# Patient Record
Sex: FEMALE | Race: WHITE | Employment: FULL TIME | URBAN - METROPOLITAN AREA
[De-identification: names, ages, dates, MRNs, and addresses within clinical notes are randomized per-mention and may not be internally consistent; named-entity substitution may affect disease eponyms.]

---

## 2019-02-04 ENCOUNTER — OFFICE VISIT (OUTPATIENT)
Dept: PRIMARY CARE CLINIC | Age: 59
End: 2019-02-04

## 2019-02-04 VITALS
RESPIRATION RATE: 16 BRPM | BODY MASS INDEX: 34.53 KG/M2 | SYSTOLIC BLOOD PRESSURE: 179 MMHG | HEART RATE: 94 BPM | HEIGHT: 67 IN | WEIGHT: 220 LBS | DIASTOLIC BLOOD PRESSURE: 104 MMHG | OXYGEN SATURATION: 95 % | TEMPERATURE: 98.1 F

## 2019-02-04 DIAGNOSIS — Z76.89 ENCOUNTER TO ESTABLISH CARE: ICD-10-CM

## 2019-02-04 DIAGNOSIS — I10 ESSENTIAL HYPERTENSION: Primary | ICD-10-CM

## 2019-02-04 RX ORDER — BIOTIN 10000 MCG
10000 TABLET,DISINTEGRATING ORAL DAILY
COMMUNITY

## 2019-02-04 RX ORDER — HYDROCHLOROTHIAZIDE 25 MG/1
25 TABLET ORAL DAILY
Qty: 30 TAB | Refills: 0 | Status: SHIPPED | OUTPATIENT
Start: 2019-02-04 | End: 2019-03-06 | Stop reason: SDUPTHER

## 2019-02-04 RX ORDER — NYSTATIN AND TRIAMCINOLONE ACETONIDE 100000; 1 [USP'U]/G; MG/G
OINTMENT TOPICAL
COMMUNITY
Start: 2019-01-23 | End: 2019-02-07 | Stop reason: ALTCHOICE

## 2019-02-04 RX ORDER — ASPIRIN 81 MG/1
TABLET ORAL DAILY
COMMUNITY

## 2019-02-04 RX ORDER — LISINOPRIL 10 MG/1
10 TABLET ORAL DAILY
Qty: 30 TAB | Refills: 0 | Status: SHIPPED | OUTPATIENT
Start: 2019-02-04 | End: 2019-03-06 | Stop reason: SDUPTHER

## 2019-02-04 NOTE — PROGRESS NOTES
Chief Complaint Patient presents with [address] [place] [address]  Blood Pressure Check  
  not currently taking any meds, but was told by OBGYN to get checked out 1. Have you been to the ER, urgent care clinic since your last visit? Hospitalized since your last visit? No 
 
2. Have you seen or consulted any other health care providers outside of the [address] [place] [address] since your last visit? Include any pap smears or colon screening. No 
 
 
Never had colonscopy, wants flu shot but we don't have anymore here. Needs mammo, last one in 2016.

## 2019-02-04 NOTE — PROGRESS NOTES
Subjective: Chief Complaint Patient presents with [facility]  Blood Pressure Check  
  not currently taking any meds, but was told by OBGYN to get checked out She  is a 66y.o. year old female who presents today as a new patient to establish as well as with some concerns. She reports that when she went to her Ob-gyn office couple of weeks ago BP was found to be in 180s/100s. She has been having some dull headache with no associated N/V, blurry vision otherwise doing fine. Patient reports that she does have hx of depression. Previously she was on medication but could not tolerate so she stopped. Notes  easily gets emotional but she does not require any treatment at this point she states. No other concern today. A comprehensive review of systems was negative except for that written in the HPI. Objective:  
 
Vitals:  
 02/04/19 1016 BP: (!) 180/104 Pulse: 94 Resp: 16 Temp: 98.1 °F (36.7 °C) TempSrc: Oral  
SpO2: 95% Weight: 220 lb (99.8 kg) Height: 5' 7\" (1.702 m) Physical Examination: General appearance - alert, well appearing, and in no distress, oriented to person, place, and time and overweight Mental status - alert, oriented to person, place, and time, normal mood, behavior, speech, dress, motor activity, and thought processes Ears - bilateral TM's and external ear canals normal 
Nose - normal and patent, no erythema, discharge or polyps Mouth - mucous membranes moist, pharynx normal without lesions Neck - supple, no significant adenopathy, thyroid exam: thyroid is normal in size without nodules or tenderness Chest - clear to auscultation, no wheezes, rales or rhonchi, symmetric air entry Heart - normal rate, regular rhythm, normal S1, S2, no murmurs, rubs, clicks or gallops Neurological - alert, oriented, normal speech, no focal findings or movement disorder noted Extremities - no pedal edema noted Allergies Allergen Reactions  Pcn [Penicillins] Unknown (comments) Had reaction as child but does not remember what kind of reaction Social History Socioeconomic History  Marital status:  Spouse name: Not on file  Number of children: Not on file  Years of education: Not on file  Highest education level: Not on file Tobacco Use  Smoking status: Former Smoker  Smokeless tobacco: Never Used Substance and Sexual Activity  Alcohol use: No  
  Frequency: Never  Drug use: No  
  
No family history on file. Past Surgical History:  
Procedure Laterality Date [address]  HX TONSILLECTOMY  HX WISDOM TEETH EXTRACTION Past Medical History:  
Diagnosis Date  Depression  HPV in female  Seizure [facility])   
 as a child Current Outpatient Medications Medication Sig Dispense Refill  nystatin-triamcinolone (MYCOLOG) 100,000-0.1 unit/gram-% ointment  pseudoephedrine ER (SUDAFED 24 HOUR) 240 mg Tb24 tablet Take 240 mg by mouth every twenty-four (24) hours.  biotin 10,000 mcg TbDi Take  by mouth.  aspirin delayed-release 81 mg tablet Take  by mouth daily. Assessment/ Plan:  
Diagnoses and all orders for this visit: 1. Essential hypertension -     METABOLIC PANEL, BASIC 
-   Start   lisinopril (PRINIVIL, ZESTRIL) 10 mg tablet; Take 1 Tab by mouth daily. 
-    Start  hydroCHLOROthiazide (HYDRODIURIL) 25 mg tablet; Take 1 Tab by mouth daily. - counseled on low salt diet, exercise, effort to loose weight. 2. Encounter to establish care -     METABOLIC PANEL, BASIC 
-     lisinopril (PRINIVIL, ZESTRIL) 10 mg tablet; Take 1 Tab by mouth daily. -     hydroCHLOROthiazide (HYDRODIURIL) 25 mg tablet; Take 1 Tab by mouth daily. Medication risks/benefits/costs/interactions/alternatives discussed with patient.  
Advised patient to call back or return to office if symptoms worsen/change/persist. If patient cannot reach us or should anything more severe/urgent arise he/she should proceed directly to the nearest emergency department. Discussed expected course/resolution/complications of diagnosis in detail with patient. Patient given a written after visit summary which includes her diagnoses, current medications and vitals. Patient expressed understanding with the diagnosis and plan. Follow-up Disposition: 
Return in about 3 weeks (around 2/25/2019) for complete physical  and fasting blood work., Bring BP log book. , have fasting blood work done 2-3 day.

## 2019-02-04 NOTE — PATIENT INSTRUCTIONS
DASH Diet: Care Instructions Your Care Instructions The DASH diet is an eating plan that can help lower your blood pressure. DASH stands for Dietary Approaches to Stop Hypertension. Hypertension is high blood pressure. The DASH diet focuses on eating foods that are high in calcium, potassium, and magnesium. These nutrients can lower blood pressure. The foods that are highest in these nutrients are fruits, vegetables, low-fat dairy products, nuts, seeds, and legumes. But taking calcium, potassium, and magnesium supplements instead of eating foods that are high in those nutrients does not have the same effect. The DASH diet also includes whole grains, fish, and poultry. The DASH diet is one of several lifestyle changes your doctor may recommend to lower your high blood pressure. Your doctor may also want you to decrease the amount of sodium in your diet. Lowering sodium while following the DASH diet can lower blood pressure even further than just the DASH diet alone. Follow-up care is a key part of your treatment and safety. Be sure to make and go to all appointments, and call your doctor if you are having problems. It's also a good idea to know your test results and keep a list of the medicines you take. How can you care for yourself at home? Following the DASH diet · Eat 4 to 5 servings of fruit each day. A serving is 1 medium-sized piece of fruit, ½ cup chopped or canned fruit, 1/4 cup dried fruit, or 4 ounces (½ cup) of fruit juice. Choose fruit more often than fruit juice. · Eat 4 to 5 servings of vegetables each day. A serving is 1 cup of lettuce or raw leafy vegetables, ½ cup of chopped or cooked vegetables, or 4 ounces (½ cup) of vegetable juice. Choose vegetables more often than vegetable juice. · Get 2 to 3 servings of low-fat and fat-free dairy each day. A serving is 8 ounces of milk, 1 cup of yogurt, or 1 ½ ounces of cheese. · Eat 6 to 8 servings of grains each day. A serving is 1 slice of bread, 1 ounce of dry cereal, or ½ cup of cooked rice, pasta, or cooked cereal. Try to choose whole-grain products as much as possible. · Limit lean meat, poultry, and fish to 2 servings each day. A serving is 3 ounces, about the size of a deck of cards. · Eat 4 to 5 servings of nuts, seeds, and legumes (cooked dried beans, lentils, and split peas) each week. A serving is 1/3 cup of nuts, 2 tablespoons of seeds, or ½ cup of cooked beans or peas. · Limit fats and oils to 2 to 3 servings each day. A serving is 1 teaspoon of vegetable oil or 2 tablespoons of salad dressing. · Limit sweets and added sugars to 5 servings or less a week. A serving is 1 tablespoon jelly or jam, ½ cup sorbet, or 1 cup of lemonade. · Eat less than 2,300 milligrams (mg) of sodium a day. If you limit your sodium to 1,500 mg a day, you can lower your blood pressure even more. Tips for success · Start small. Do not try to make dramatic changes to your diet all at once. You might feel that you are missing out on your favorite foods and then be more likely to not follow the plan. Make small changes, and stick with them. Once those changes become habit, add a few more changes. · Try some of the following: ? Make it a goal to eat a fruit or vegetable at every meal and at snacks. This will make it easy to get the recommended amount of fruits and vegetables each day. ? Try yogurt topped with fruit and nuts for a snack or healthy dessert. ? Add lettuce, tomato, cucumber, and onion to sandwiches. ? Combine a ready-made pizza crust with low-fat mozzarella cheese and lots of vegetable toppings. Try using tomatoes, squash, spinach, broccoli, carrots, cauliflower, and onions. ? Have a variety of cut-up vegetables with a low-fat dip as an appetizer instead of chips and dip. ? Sprinkle sunflower seeds or chopped almonds over salads.  Or try adding chopped walnuts or almonds to cooked vegetables. ? Try some vegetarian meals using beans and peas. Add garbanzo or kidney beans to salads. Make burritos and tacos with mashed edge beans or black beans. Where can you learn more? Go to http://randee-juana.info/. Enter S604 in the search box to learn more about \"DASH Diet: Care Instructions. \" Current as of: July 22, 2018 Content Version: 11.9 © 5099-1853 [employer]. Care instructions adapted under license by [employer] (which disclaims liability or warranty for this information). If you have questions about a medical condition or this instruction, always ask your healthcare professional. [address] any warranty or liability for your use of this information. Learning About High Blood Pressure What is high blood pressure? Blood pressure is a measure of how hard the blood pushes against the walls of your arteries. It's normal for blood pressure to go up and down throughout the day, but if it stays up, you have high blood pressure. Another name for high blood pressure is hypertension. Two numbers tell you your blood pressure. The first number is the systolic pressure. It shows how hard the blood pushes when your heart is pumping. The second number is the diastolic pressure. It shows how hard the blood pushes between heartbeats, when your heart is relaxed and filling with blood. Your doctor will give you a goal for your blood pressure. Your goal will be based on your health and your age. High blood pressure (hypertension) means that the top number stays high, or the bottom number stays high, or both. High blood pressure increases the risk of stroke, heart attack, and other problems. You and your doctor will talk about your risks of these problems based on your blood pressure. What happens when you have high blood pressure? · Blood flows through your arteries with too much force. Over time, this damages the walls of your arteries. But you can't feel it. High blood pressure usually doesn't cause symptoms. · Fat and calcium start to build up in your arteries. This buildup is called plaque. Plaque makes your arteries narrower and stiffer. Blood can't flow through them as easily. · This lack of good blood flow starts to damage some of the organs in your body. This can lead to problems such as coronary artery disease and heart attack, heart failure, stroke, kidney failure, and eye damage. How can you prevent high blood pressure? · Stay at a healthy weight. · Try to limit how much sodium you eat to less than 2,300 milligrams (mg) a day. If you limit your sodium to 1,500 mg a day, you can lower your blood pressure even more. ? Buy foods that are labeled \"unsalted,\" \"sodium-free,\" or \"low-sodium. \" Foods labeled \"reduced-sodium\" and \"light sodium\" may still have too much sodium. ? Flavor your food with garlic, lemon juice, onion, vinegar, herbs, and spices instead of salt. Do not use soy sauce, steak sauce, onion salt, garlic salt, mustard, or ketchup on your food. ? Use less salt (or none) when recipes call for it. You can often use half the salt a recipe calls for without losing flavor. · Be physically active. Get at least 30 minutes of exercise on most days of the week. Walking is a good choice. You also may want to do other activities, such as running, swimming, cycling, or playing tennis or team sports. · Limit alcohol to 2 drinks a day for men and 1 drink a day for women. · Eat plenty of fruits, vegetables, and low-fat dairy products. Eat less saturated and total fats. How is high blood pressure treated? · Your doctor will suggest making lifestyle changes. For example, your doctor may ask you to eat healthy foods, quit smoking, lose extra weight, and be more active. · If lifestyle changes don't help enough, your doctor may recommend that you take medicine. · When blood pressure is very high, medicines are needed to lower it. Follow-up care is a key part of your treatment and safety. Be sure to make and go to all appointments, and call your doctor if you are having problems. It's also a good idea to know your test results and keep a list of the medicines you take. Where can you learn more? Go to http://randee-juana.info/. Enter P501 in the search box to learn more about \"Learning About High Blood Pressure. \" Current as of: July 22, 2018 Content Version: 11.9 © 5709-1504 [employer]. Care instructions adapted under license by [employer] (which disclaims liability or warranty for this information). If you have questions about a medical condition or this instruction, always ask your healthcare professional. [address] any warranty or liability for your use of this information.

## 2019-02-05 ENCOUNTER — TELEPHONE (OUTPATIENT)
Dept: PRIMARY CARE CLINIC | Age: 59
End: 2019-02-05

## 2019-02-05 LAB
BUN SERPL-MCNC: 10 MG/DL (ref 6–24)
BUN/CREAT SERPL: 14 (ref 9–23)
CALCIUM SERPL-MCNC: 9.6 MG/DL (ref 8.7–10.2)
CHLORIDE SERPL-SCNC: 100 MMOL/L (ref 96–106)
CO2 SERPL-SCNC: 23 MMOL/L (ref 20–29)
CREAT SERPL-MCNC: 0.72 MG/DL (ref 0.57–1)
GLUCOSE SERPL-MCNC: 286 MG/DL (ref 65–99)
POTASSIUM SERPL-SCNC: 4.2 MMOL/L (ref 3.5–5.2)
SODIUM SERPL-SCNC: 137 MMOL/L (ref 134–144)

## 2019-02-05 NOTE — TELEPHONE ENCOUNTER
----- Message from David Sparrow MD sent at 2/5/2019  7:57 AM EST -----  Please call patient:    Blood sugar level is very high. I am concern about diabetes. She needs to come in for follow up as an office visit as soon as possible. Okay to double book if needed.

## 2019-02-05 NOTE — TELEPHONE ENCOUNTER
Patient stated she is at work- you can leave a detailed message. Or she will try to  the phone later.

## 2019-02-05 NOTE — PROGRESS NOTES
Please call patient: 
 
Blood sugar level is very high. I am concern about diabetes. She needs to come in for follow up as an office visit as soon as possible. Okay to double book if needed.

## 2019-02-07 ENCOUNTER — OFFICE VISIT (OUTPATIENT)
Dept: PRIMARY CARE CLINIC | Age: 59
End: 2019-02-07

## 2019-02-07 VITALS
WEIGHT: 218.8 LBS | RESPIRATION RATE: 17 BRPM | HEIGHT: 67 IN | HEART RATE: 91 BPM | BODY MASS INDEX: 34.34 KG/M2 | OXYGEN SATURATION: 96 % | TEMPERATURE: 98.5 F | SYSTOLIC BLOOD PRESSURE: 123 MMHG | DIASTOLIC BLOOD PRESSURE: 84 MMHG

## 2019-02-07 DIAGNOSIS — I10 ESSENTIAL HYPERTENSION: ICD-10-CM

## 2019-02-07 DIAGNOSIS — E11.9 TYPE 2 DIABETES MELLITUS WITHOUT COMPLICATION, WITH LONG-TERM CURRENT USE OF INSULIN (HCC): Primary | ICD-10-CM

## 2019-02-07 DIAGNOSIS — Z79.4 TYPE 2 DIABETES MELLITUS WITHOUT COMPLICATION, WITH LONG-TERM CURRENT USE OF INSULIN (HCC): Primary | ICD-10-CM

## 2019-02-07 DIAGNOSIS — R73.9 ELEVATED BLOOD SUGAR LEVEL: ICD-10-CM

## 2019-02-07 LAB — HBA1C MFR BLD HPLC: 11.7 %

## 2019-02-07 RX ORDER — LANCETS
EACH MISCELLANEOUS
Qty: 100 EACH | Refills: 11 | Status: SHIPPED | OUTPATIENT
Start: 2019-02-07 | End: 2019-02-18 | Stop reason: ALTCHOICE

## 2019-02-07 RX ORDER — BLOOD-GLUCOSE METER
EACH MISCELLANEOUS
Qty: 1 EACH | Refills: 0 | Status: SHIPPED | OUTPATIENT
Start: 2019-02-07 | End: 2019-02-18 | Stop reason: ALTCHOICE

## 2019-02-07 RX ORDER — METFORMIN HYDROCHLORIDE 500 MG/1
500 TABLET, EXTENDED RELEASE ORAL 2 TIMES DAILY WITH MEALS
Qty: 60 TAB | Refills: 2 | Status: SHIPPED | OUTPATIENT
Start: 2019-02-07 | End: 2019-03-14

## 2019-02-07 NOTE — PROGRESS NOTES
Subjective: Chief Complaint Patient presents with  
 High Blood Sugar  Medication Evaluation BP medication was making her feel dizzy, nasueous \"medicine head\" She  is a 66y.o. year old female with recently diagnosed HTN who presents today to discuss lab results. Her recent lab results showed random blood sugar 286. Never been diagnosed with DM in the past.  
Patient reports that since she started taking BP med she felt little lightheaded but headache is gone. Denies any urinary symptoms, increase thirst, chest pain. Lab Results Component Value Date/Time Sodium 137 02/04/2019 11:03 AM  
 Potassium 4.2 02/04/2019 11:03 AM  
 Chloride 100 02/04/2019 11:03 AM  
 CO2 23 02/04/2019 11:03 AM  
 Glucose 286 (H) 02/04/2019 11:03 AM  
 BUN 10 02/04/2019 11:03 AM  
 Creatinine 0.72 02/04/2019 11:03 AM  
 BUN/Creatinine ratio 14 02/04/2019 11:03 AM  
 GFR est  02/04/2019 11:03 AM  
 GFR est non-AA 94 02/04/2019 11:03 AM  
 Calcium 9.6 02/04/2019 11:03 AM  
 
 
 
Pertinent items are noted in HPI. Objective:  
 
Vitals:  
 02/07/19 1450 BP: 123/84 Pulse: 91  
Resp: 17 Temp: 98.5 °F (36.9 °C) TempSrc: Oral  
SpO2: 96% Weight: 218 lb 12.8 oz (99.2 kg) Height: 5' 7\" (1.702 m) Physical Examination: General appearance - alert, well appearing, and in no distress, oriented to person, place, and time and overweight Mental status - alert, oriented to person, place, and time, normal mood, behavior, speech, dress, motor activity, and thought processes Chest - clear to auscultation, no wheezes, rales or rhonchi, symmetric air entry Heart - normal rate, regular rhythm, normal S1, S2, no murmurs, rubs, clicks or gallops. Allergies Allergen Reactions  Pcn [Penicillins] Unknown (comments) Had reaction as child but does not remember what kind of reaction Social History Socioeconomic History  Marital status:  Spouse name: Not on file  Number of children: Not on file  Years of education: Not on file  Highest education level: Not on file Tobacco Use  Smoking status: Former Smoker  Smokeless tobacco: Never Used Substance and Sexual Activity  Alcohol use: No  
  Frequency: Never  Drug use: No  
 Sexual activity: Yes  
  Partners: Male Family History Problem Relation Age of Onset  Hypertension Mother  Diabetes Father  Hypertension Sister Past Surgical History:  
Procedure Laterality Date [address]  HX TONSILLECTOMY  HX WISDOM TEETH EXTRACTION Past Medical History:  
Diagnosis Date  Depression  HPV in female  Seizure [facility])   
 as a child Current Outpatient Medications Medication Sig Dispense Refill  phenylephrine HCl (SUDAFED PE PO) Take 1 Tab by mouth every morning.  biotin 10,000 mcg TbDi Take 10,000 mcg by mouth daily.  aspirin delayed-release 81 mg tablet Take  by mouth daily.  lisinopril (PRINIVIL, ZESTRIL) 10 mg tablet Take 1 Tab by mouth daily. 30 Tab 0  
 hydroCHLOROthiazide (HYDRODIURIL) 25 mg tablet Take 1 Tab by mouth daily. 30 Tab 0 Assessment/ Plan:  
Diagnoses and all orders for this visit: 
 
1. Type 2 diabetes mellitus without complication, with long-term current use of insulin ([facility] [address] -    A1c is 11.7 Newly diagnosed diabetic. 
 -   Start metFORMIN ER (GLUCOPHAGE XR) 500 mg tablet; Take 1 Tab by mouth two (2) times daily (with meals). -   Start   SITagliptin (JANUVIA) 50 mg tablet; Take 1 Tab by mouth daily. 
-     MICROALBUMIN, UR, RAND W/ MICROALB/CREAT RATIO 
-     REFERRAL TO OPHTHALMOLOGY 
-     Blood-Glucose Meter (ONETOUCH VERIO FLEX) misc; Check fasting blood sugar Dx:Type 2 diabetes mellitus without complication, with long-term current use of insulin E11.9 
-     glucose blood VI test strips (ONETOUCH VERIO) strip;  Checking fasting blood sugar Dx: Type 2 diabetes mellitus without complication, with long-term current use of insulin E11.9 
-     lancets misc; Checking fasting blood sugar Dx: Type 2 diabetes mellitus without complication, with long-term current use of insulin E11.9 
      -      Yearly eye exam, foot care discussed. -      Offered diabetic nutritionist counseling but Pt would like to defer at this tie. 2. Elevated blood sugar level -     AMB POC HEMOGLOBIN A1C 11.7. See above. 3. Essential hypertension - BP well controlled with current med. Medication risks/benefits/costs/interactions/alternatives discussed with patient. Advised patient to call back or return to office if symptoms worsen/change/persist. If patient cannot reach us or should anything more severe/urgent arise he/she should proceed directly to the nearest emergency department. Discussed expected course/resolution/complications of diagnosis in detail with patient. Patient given a written after visit summary which includes her diagnoses, current medications and vitals. Patient expressed understanding with the diagnosis and plan. Follow-up Disposition: 
Return for 3-4 weeks for BP follow up. , complete physical  and fasting blood work. Jakob Panda

## 2019-02-07 NOTE — PATIENT INSTRUCTIONS
Learning About Diabetes Food Guidelines Your Care Instructions Meal planning is important to manage diabetes. It helps keep your blood sugar at a target level (which you set with your doctor). You don't have to eat special foods. You can eat what your family eats, including sweets once in a while. But you do have to pay attention to how often you eat and how much you eat of certain foods. You may want to work with a dietitian or a certified diabetes educator (CDE) to help you plan meals and snacks. A dietitian or CDE can also help you lose weight if that is one of your goals. What should you know about eating carbs? Managing the amount of carbohydrate (carbs) you eat is an important part of healthy meals when you have diabetes. Carbohydrate is found in many foods. · Learn which foods have carbs. And learn the amounts of carbs in different foods. ? Bread, cereal, pasta, and rice have about 15 grams of carbs in a serving. A serving is 1 slice of bread (1 ounce), ½ cup of cooked cereal, or 1/3 cup of cooked pasta or rice. ? Fruits have 15 grams of carbs in a serving. A serving is 1 small fresh fruit, such as an apple or orange; ½ of a banana; ½ cup of cooked or canned fruit; ½ cup of fruit juice; 1 cup of melon or raspberries; or 2 tablespoons of dried fruit. ? Milk and no-sugar-added yogurt have 15 grams of carbs in a serving. A serving is 1 cup of milk or 2/3 cup of no-sugar-added yogurt. ? Starchy vegetables have 15 grams of carbs in a serving. A serving is ½ cup of mashed potatoes or sweet potato; 1 cup winter squash; ½ of a small baked potato; ½ cup of cooked beans; or ½ cup cooked corn or green peas. · Learn how much carbs to eat each day and at each meal. A dietitian or CDE can teach you how to keep track of the amount of carbs you eat. This is called carbohydrate counting.  
· If you are not sure how to count carbohydrate grams, use the Plate Method to plan meals. It is a good, quick way to make sure that you have a balanced meal. It also helps you spread carbs throughout the day. ? Divide your plate by types of foods. Put non-starchy vegetables on half the plate, meat or other protein food on one-quarter of the plate, and a grain or starchy vegetable in the final quarter of the plate. To this you can add a small piece of fruit and 1 cup of milk or yogurt, depending on how many carbs you are supposed to eat at a meal. 
· Try to eat about the same amount of carbs at each meal. Do not \"save up\" your daily allowance of carbs to eat at one meal. 
· Proteins have very little or no carbs per serving. Examples of proteins are beef, chicken, turkey, fish, eggs, tofu, cheese, cottage cheese, and peanut butter. A serving size of meat is 3 ounces, which is about the size of a deck of cards. Examples of meat substitute serving sizes (equal to 1 ounce of meat) are 1/4 cup of cottage cheese, 1 egg, 1 tablespoon of peanut butter, and ½ cup of tofu. How can you eat out and still eat healthy? · Learn to estimate the serving sizes of foods that have carbohydrate. If you measure food at home, it will be easier to estimate the amount in a serving of restaurant food. · If the meal you order has too much carbohydrate (such as potatoes, corn, or baked beans), ask to have a low-carbohydrate food instead. Ask for a salad or green vegetables. · If you use insulin, check your blood sugar before and after eating out to help you plan how much to eat in the future. · If you eat more carbohydrate at a meal than you had planned, take a walk or do other exercise. This will help lower your blood sugar. What else should you know? · Limit saturated fat, such as the fat from meat and dairy products. This is a healthy choice because people who have diabetes are at higher risk of heart disease.  So choose lean cuts of meat and nonfat or low-fat dairy products. Use olive or canola oil instead of butter or shortening when cooking. · Don't skip meals. Your blood sugar may drop too low if you skip meals and take insulin or certain medicines for diabetes. · Check with your doctor before you drink alcohol. Alcohol can cause your blood sugar to drop too low. Alcohol can also cause a bad reaction if you take certain diabetes medicines. Follow-up care is a key part of your treatment and safety. Be sure to make and go to all appointments, and call your doctor if you are having problems. It's also a good idea to know your test results and keep a list of the medicines you take. Where can you learn more? Go to http://randee-juana.info/. Enter C777 in the search box to learn more about \"Learning About Diabetes Food Guidelines. \" Current as of: July 25, 2018 Content Version: 11.9 © 4052-8613 "[employer]. Care instructions adapted under license by [employer] (which disclaims liability or warranty for this information). If you have questions about a medical condition or this instruction, always ask your healthcare professional. [address] any warranty or liability for your use of this information. Diabetes Foot Health: Care Instructions Your Care Instructions When you have diabetes, your feet need extra care and attention. Diabetes can damage the nerve endings and blood vessels in your feet, making you less likely to notice when your feet are injured. Diabetes also limits your body's ability to fight infection and get blood to areas that need it. If you get a minor foot injury, it could become an ulcer or a serious infection. With good foot care, you can prevent most of these problems. Caring for your feet can be quick and easy. Most of the care can be done when you are bathing or getting ready for bed. Follow-up care is a key part of your treatment and safety.  Be sure to make and go to all appointments, and call your doctor if you are having problems. It's also a good idea to know your test results and keep a list of the medicines you take. How can you care for yourself at home? · Keep your blood sugar close to normal by watching what and how much you eat, monitoring blood sugar, taking medicines if prescribed, and getting regular exercise. · Do not smoke. Smoking affects blood flow and can make foot problems worse. If you need help quitting, talk to your doctor about stop-smoking programs and medicines. These can increase your chances of quitting for good. · Eat a diet that is low in fats. High fat intake can cause fat to build up in your blood vessels and decrease blood flow. · Inspect your feet daily for blisters, cuts, cracks, or sores. If you cannot see well, use a mirror or have someone help you. · Take care of your feet: 
? Wash your feet every day. Use warm (not hot) water. Check the water temperature with your wrists or other part of your body, not your feet. ? Dry your feet well. Pat them dry. Do not rub the skin on your feet too hard. Dry well between your toes. If the skin on your feet stays moist, bacteria or a fungus can grow, which can lead to infection. ? Keep your skin soft. Use moisturizing skin cream to keep the skin on your feet soft and prevent calluses and cracks. But do not put the cream between your toes, and stop using any cream that causes a rash. ? Clean underneath your toenails carefully. Do not use a sharp object to clean underneath your toenails. Use the blunt end of a nail file or other rounded tool. ? Trim and file your toenails straight across to prevent ingrown toenails. Use a nail clipper, not scissors. Use an emery board to smooth the edges. · Change socks daily. Socks without seams are best, because seams often rub the feet. You can find socks for people with diabetes from specialty catalogs. · Look inside your shoes every day for things like gravel or torn linings, which could cause blisters or sores. · Buy shoes that fit well: 
? Look for shoes that have plenty of space around the toes. This helps prevent bunions and blisters. ? Try on shoes while wearing the kind of socks you will usually wear with the shoes. ? Avoid plastic shoes. They may rub your feet and cause blisters. Good shoes should be made of materials that are flexible and breathable, such as leather or cloth. ? Break in new shoes slowly by wearing them for no more than an hour a day for several days. Take extra time to check your feet for red areas, blisters, or other problems after you wear new shoes. · Do not go barefoot. Do not wear sandals, and do not wear shoes with very thin soles. Thin soles are easy to puncture. They also do not protect your feet from hot pavement or cold weather. · Have your doctor check your feet during each visit. If you have a foot problem, see your doctor. Do not try to treat an early foot problem at home. Home remedies or treatments that you can buy without a prescription (such as corn removers) can be harmful. · Always get early treatment for foot problems. A minor irritation can lead to a major problem if not properly cared for early. When should you call for help? Call your doctor now or seek immediate medical care if: 
  · You have a foot sore, an ulcer or break in the skin that is not healing after 4 days, bleeding corns or calluses, or an ingrown toenail.  
  · You have blue or black areas, which can mean bruising or blood flow problems.  
  · You have peeling skin or tiny blisters between your toes or cracking or oozing of the skin.  
  · You have a fever for more than 24 hours and a foot sore.  
  · You have new numbness or tingling in your feet that does not go away after you move your feet or change positions.  
  · You have unexplained or unusual swelling of the foot or ankle.  Watch closely for changes in your health, and be sure to contact your doctor if: 
  · You cannot do proper foot care. Where can you learn more? Go to http://randee-juana.info/. Enter A739 in the search box to learn more about \"Diabetes Foot Health: Care Instructions. \" Current as of: July 25, 2018 Content Version: 11.9 © 4585-3721 [employer]. Care instructions adapted under license by [employer] (which disclaims liability or warranty for this information). If you have questions about a medical condition or this instruction, always ask your healthcare professional. [place] [address] any warranty or liability for your use of this information.

## 2019-02-08 LAB
ALBUMIN/CREAT UR: 27.8 MG/G CREAT (ref 0–30)
CREAT UR-MCNC: 68.4 MG/DL
MICROALBUMIN UR-MCNC: 19 UG/ML

## 2019-02-08 NOTE — PROGRESS NOTES
Please notify patient: 
 
Urine test came back normal.  Microalbumin/creatinine ratio is a marker of the amount of protein in your urine. Goal is less than 30. Your value is normal. This indicates that your kidneys are not being affected by your diabetes and/or blood pressure at this time. Continue all medications as prescribed to avoid kidney damage.

## 2019-02-18 NOTE — TELEPHONE ENCOUNTER
Please write a rx for blood sugar monitor, test strips and lancets    One touch ultra mini, [facility] in [place]

## 2019-02-19 RX ORDER — LANCETS
EACH MISCELLANEOUS
Qty: 1 EACH | Refills: 11 | Status: SHIPPED | OUTPATIENT
Start: 2019-02-19

## 2019-02-19 RX ORDER — INSULIN PUMP SYRINGE, 3 ML
EACH MISCELLANEOUS
Qty: 1 KIT | Refills: 0 | Status: SHIPPED | OUTPATIENT
Start: 2019-02-19

## 2019-03-06 DIAGNOSIS — E11.9 TYPE 2 DIABETES MELLITUS WITHOUT COMPLICATION, WITH LONG-TERM CURRENT USE OF INSULIN (HCC): ICD-10-CM

## 2019-03-06 DIAGNOSIS — Z76.89 ENCOUNTER TO ESTABLISH CARE: ICD-10-CM

## 2019-03-06 DIAGNOSIS — I10 ESSENTIAL HYPERTENSION: ICD-10-CM

## 2019-03-06 DIAGNOSIS — Z79.4 TYPE 2 DIABETES MELLITUS WITHOUT COMPLICATION, WITH LONG-TERM CURRENT USE OF INSULIN (HCC): ICD-10-CM

## 2019-03-06 RX ORDER — LISINOPRIL 10 MG/1
10 TABLET ORAL DAILY
Qty: 30 TAB | Refills: 0 | Status: SHIPPED | OUTPATIENT
Start: 2019-03-06 | End: 2019-04-07 | Stop reason: SDUPTHER

## 2019-03-06 RX ORDER — METFORMIN HYDROCHLORIDE 500 MG/1
500 TABLET, EXTENDED RELEASE ORAL 2 TIMES DAILY WITH MEALS
Qty: 60 TAB | Refills: 2 | Status: CANCELLED | OUTPATIENT
Start: 2019-03-06

## 2019-03-06 RX ORDER — HYDROCHLOROTHIAZIDE 25 MG/1
25 TABLET ORAL DAILY
Qty: 30 TAB | Refills: 0 | Status: SHIPPED | OUTPATIENT
Start: 2019-03-06 | End: 2019-04-07 | Stop reason: SDUPTHER

## 2019-03-11 DIAGNOSIS — E11.9 TYPE 2 DIABETES MELLITUS WITHOUT COMPLICATION, WITH LONG-TERM CURRENT USE OF INSULIN (HCC): ICD-10-CM

## 2019-03-11 DIAGNOSIS — Z00.00 WELL ADULT ON ROUTINE HEALTH CHECK: Primary | ICD-10-CM

## 2019-03-11 DIAGNOSIS — I10 ESSENTIAL HYPERTENSION: ICD-10-CM

## 2019-03-11 DIAGNOSIS — Z79.4 TYPE 2 DIABETES MELLITUS WITHOUT COMPLICATION, WITH LONG-TERM CURRENT USE OF INSULIN (HCC): ICD-10-CM

## 2019-03-12 LAB
25(OH)D3+25(OH)D2 SERPL-MCNC: 11.7 NG/ML (ref 30–100)
ALBUMIN SERPL-MCNC: 4.5 G/DL (ref 3.5–5.5)
ALBUMIN/GLOB SERPL: 2 {RATIO} (ref 1.2–2.2)
ALP SERPL-CCNC: 85 IU/L (ref 39–117)
ALT SERPL-CCNC: 19 IU/L (ref 0–32)
AST SERPL-CCNC: 17 IU/L (ref 0–40)
BASOPHILS # BLD AUTO: 0 X10E3/UL (ref 0–0.2)
BASOPHILS NFR BLD AUTO: 0 %
BILIRUB SERPL-MCNC: 0.3 MG/DL (ref 0–1.2)
BUN SERPL-MCNC: 14 MG/DL (ref 6–24)
BUN/CREAT SERPL: 16 (ref 9–23)
CALCIUM SERPL-MCNC: 9.4 MG/DL (ref 8.7–10.2)
CHLORIDE SERPL-SCNC: 95 MMOL/L (ref 96–106)
CHOLEST SERPL-MCNC: 269 MG/DL (ref 100–199)
CO2 SERPL-SCNC: 22 MMOL/L (ref 20–29)
CREAT SERPL-MCNC: 0.89 MG/DL (ref 0.57–1)
EOSINOPHIL # BLD AUTO: 0.1 X10E3/UL (ref 0–0.4)
EOSINOPHIL NFR BLD AUTO: 1 %
ERYTHROCYTE [DISTWIDTH] IN BLOOD BY AUTOMATED COUNT: 13.2 % (ref 12.3–15.4)
GLOBULIN SER CALC-MCNC: 2.3 G/DL (ref 1.5–4.5)
GLUCOSE SERPL-MCNC: 210 MG/DL (ref 65–99)
HCT VFR BLD AUTO: 37 % (ref 34–46.6)
HDLC SERPL-MCNC: 46 MG/DL
HGB BLD-MCNC: 12 G/DL (ref 11.1–15.9)
IMM GRANULOCYTES # BLD AUTO: 0 X10E3/UL (ref 0–0.1)
IMM GRANULOCYTES NFR BLD AUTO: 0 %
LDLC SERPL CALC-MCNC: 187 MG/DL (ref 0–99)
LYMPHOCYTES # BLD AUTO: 2 X10E3/UL (ref 0.7–3.1)
LYMPHOCYTES NFR BLD AUTO: 33 %
MCH RBC QN AUTO: 27 PG (ref 26.6–33)
MCHC RBC AUTO-ENTMCNC: 32.4 G/DL (ref 31.5–35.7)
MCV RBC AUTO: 83 FL (ref 79–97)
MONOCYTES # BLD AUTO: 0.4 X10E3/UL (ref 0.1–0.9)
MONOCYTES NFR BLD AUTO: 7 %
NEUTROPHILS # BLD AUTO: 3.7 X10E3/UL (ref 1.4–7)
NEUTROPHILS NFR BLD AUTO: 59 %
PLATELET # BLD AUTO: 338 X10E3/UL (ref 150–379)
POTASSIUM SERPL-SCNC: 3.8 MMOL/L (ref 3.5–5.2)
PROT SERPL-MCNC: 6.8 G/DL (ref 6–8.5)
RBC # BLD AUTO: 4.44 X10E6/UL (ref 3.77–5.28)
SODIUM SERPL-SCNC: 138 MMOL/L (ref 134–144)
TRIGL SERPL-MCNC: 179 MG/DL (ref 0–149)
TSH SERPL DL<=0.005 MIU/L-ACNC: 3.3 UIU/ML (ref 0.45–4.5)
VLDLC SERPL CALC-MCNC: 36 MG/DL (ref 5–40)
WBC # BLD AUTO: 6.3 X10E3/UL (ref 3.4–10.8)

## 2019-03-14 ENCOUNTER — OFFICE VISIT (OUTPATIENT)
Dept: PRIMARY CARE CLINIC | Age: 59
End: 2019-03-14

## 2019-03-14 VITALS
OXYGEN SATURATION: 96 % | SYSTOLIC BLOOD PRESSURE: 114 MMHG | BODY MASS INDEX: 33.34 KG/M2 | WEIGHT: 212.4 LBS | HEIGHT: 67 IN | HEART RATE: 99 BPM | RESPIRATION RATE: 17 BRPM | DIASTOLIC BLOOD PRESSURE: 63 MMHG | TEMPERATURE: 98.9 F

## 2019-03-14 DIAGNOSIS — E78.5 HYPERLIPIDEMIA LDL GOAL <70: ICD-10-CM

## 2019-03-14 DIAGNOSIS — L60.2 THICKENED NAIL: ICD-10-CM

## 2019-03-14 DIAGNOSIS — Z23 ENCOUNTER FOR IMMUNIZATION: ICD-10-CM

## 2019-03-14 DIAGNOSIS — Z12.39 SCREENING FOR BREAST CANCER: ICD-10-CM

## 2019-03-14 DIAGNOSIS — E55.9 VITAMIN D DEFICIENCY: ICD-10-CM

## 2019-03-14 DIAGNOSIS — Z00.00 WELL ADULT ON ROUTINE HEALTH CHECK: Primary | ICD-10-CM

## 2019-03-14 DIAGNOSIS — Z12.11 SCREENING FOR COLON CANCER: ICD-10-CM

## 2019-03-14 DIAGNOSIS — E11.9 TYPE 2 DIABETES MELLITUS WITHOUT COMPLICATION, WITHOUT LONG-TERM CURRENT USE OF INSULIN (HCC): ICD-10-CM

## 2019-03-14 DIAGNOSIS — I10 ESSENTIAL HYPERTENSION: ICD-10-CM

## 2019-03-14 RX ORDER — METFORMIN HYDROCHLORIDE 500 MG/1
1000 TABLET, EXTENDED RELEASE ORAL 2 TIMES DAILY WITH MEALS
Qty: 120 TAB | Refills: 2 | Status: SHIPPED | OUTPATIENT
Start: 2019-03-14 | End: 2019-08-03 | Stop reason: SDUPTHER

## 2019-03-14 RX ORDER — ATORVASTATIN CALCIUM 20 MG/1
20 TABLET, FILM COATED ORAL DAILY
Qty: 90 TAB | Refills: 1 | Status: SHIPPED | OUTPATIENT
Start: 2019-03-14 | End: 2019-06-14

## 2019-03-14 RX ORDER — ASPIRIN 325 MG
50000 TABLET, DELAYED RELEASE (ENTERIC COATED) ORAL
Qty: 12 CAP | Refills: 0 | Status: SHIPPED | OUTPATIENT
Start: 2019-03-14 | End: 2019-06-14 | Stop reason: ALTCHOICE

## 2019-03-14 NOTE — PROGRESS NOTES
Subjective:   66 y.o. female for Well Woman Check as well as to discuss lab results. DM-2; She is a newly diagnosed diabetic. She was started on Metformin 500 mg BID and Januvia 50 mg daily about a month ago. Reports that FBS has been running in lower 200s range. HTN: BP has been well controlled with Lisinopril  10 mg and HCTZ 25 mg. Patient reports that she did have some stomach upset on 2/15/2019 . She went to [facility] ED where she was told that she had Gastroenteritis. She reports that she is no longer having the abdominal upset but she does have muscle cramp time to time. No LMP recorded. Patient is not currently having periods (Reason: Menopause). Social History: not sexually active. Pertinent past medical hstory: no history of  DVT, CAD,, liver disease, migraines or smoking. Lab Results   Component Value Date/Time    VITAMIN D, 25-HYDROXY 11.7 (L) 03/11/2019 08:12 AM           Lab Results   Component Value Date/Time    WBC 6.3 03/11/2019 08:12 AM    HGB 12.0 03/11/2019 08:12 AM    HCT 37.0 03/11/2019 08:12 AM    PLATELET 803 30/60/0826 08:12 AM    MCV 83 03/11/2019 08:12 AM     Lab Results   Component Value Date/Time    Cholesterol, total 269 (H) 03/11/2019 08:12 AM    HDL Cholesterol 46 03/11/2019 08:12 AM    LDL, calculated 187 (H) 03/11/2019 08:12 AM    Triglyceride 179 (H) 03/11/2019 08:12 AM     Lab Results   Component Value Date/Time    ALT (SGPT) 19 03/11/2019 08:12 AM    AST (SGOT) 17 03/11/2019 08:12 AM    Alk.  phosphatase 85 03/11/2019 08:12 AM    Bilirubin, total 0.3 03/11/2019 08:12 AM    Albumin 4.5 03/11/2019 08:12 AM    Protein, total 6.8 03/11/2019 08:12 AM    PLATELET 447 36/68/4280 08:12 AM     Lab Results   Component Value Date/Time    GFR est non-AA 73 03/11/2019 08:12 AM    GFR est AA 84 03/11/2019 08:12 AM    Creatinine 0.89 03/11/2019 08:12 AM    BUN 14 03/11/2019 08:12 AM    Sodium 138 03/11/2019 08:12 AM    Potassium 3.8 03/11/2019 08:12 AM    Chloride 95 (L) 03/11/2019 08:12 AM    CO2 22 03/11/2019 08:12 AM     Lab Results   Component Value Date/Time    TSH 3.300 03/11/2019 08:12 AM      Lab Results   Component Value Date/Time    Hemoglobin A1c (POC) 11.7 02/07/2019 03:04 PM          Patient Active Problem List   Diagnosis Code    Essential hypertension I10    Type 2 diabetes mellitus without complication, without long-term current use of insulin (HCC) E11.9     Current Outpatient Medications   Medication Sig Dispense Refill    metFORMIN ER (GLUCOPHAGE XR) 500 mg tablet Take 2 Tabs by mouth two (2) times daily (with meals). 120 Tab 2    atorvastatin (LIPITOR) 20 mg tablet Take 1 Tab by mouth daily. 90 Tab 1    cholecalciferol (VITAMIN D3) 50,000 unit capsule Take 1 Cap by mouth every seven (7) days. 12 Cap 0    lisinopril (PRINIVIL, ZESTRIL) 10 mg tablet Take 1 Tab by mouth daily. 30 Tab 0    hydroCHLOROthiazide (HYDRODIURIL) 25 mg tablet Take 1 Tab by mouth daily. 30 Tab 0    SITagliptin (JANUVIA) 50 mg tablet Take 1 Tab by mouth daily. 30 Tab 3    biotin 10,000 mcg TbDi Take 10,000 mcg by mouth daily.  aspirin delayed-release 81 mg tablet Take  by mouth daily.  Blood-Glucose Meter (ONETOUCH ULTRAMINI) monitoring kit Checking fasting blood sugar Dx: Type 2 diabetes mellitus without complication, with long-term current use of insulin E11.9 1 Kit 0    lancets (ONETOUCH ULTRASOFT LANCETS) misc Checking fasting blood sugar Dx: Type 2 diabetes mellitus without complication, with long-term current use of insulin E11.9 1 Each 11    glucose blood VI test strips (ONETOUCH ULTRA BLUE TEST STRIP) strip Checking fasting blood sugar Dx: Type 2 diabetes mellitus without complication, with long-term current use of insulin E11.9 100 Strip 11    phenylephrine HCl (SUDAFED PE PO) Take 1 Tab by mouth every morning.        Allergies   Allergen Reactions    Pcn [Penicillins] Unknown (comments)     Had reaction as child but does not remember what kind of reaction     Past Medical History:   Diagnosis Date    Depression     HPV in female     Seizure [facility])     as a child     Past Surgical History:   Procedure Laterality Date    HX BUNIONECTOMY  1998    HX TONSILLECTOMY      HX WISDOM TEETH EXTRACTION       Family History   Problem Relation Age of Onset    Hypertension Mother     Diabetes Father     Hypertension Sister      Social History     Tobacco Use    Smoking status: Former Smoker    Smokeless tobacco: Never Used   Substance Use Topics    Alcohol use: No     Frequency: Never        ROS:  Feeling well. No dyspnea or chest pain on exertion. No abdominal pain, change in bowel habits, black or bloody stools. No urinary tract symptoms. GYN ROS: no breast pain or new or enlarging lumps on self exam, no vaginal bleeding, no discharge or pelvic pain. No neurological complaints. Objective:     Visit Vitals  /63 (BP 1 Location: Left arm, BP Patient Position: Sitting)   Pulse 99   Temp 98.9 °F (37.2 °C) (Oral)   Resp 17   Ht 5' 7\" (1.702 m)   Wt 212 lb 6.4 oz (96.3 kg)   SpO2 96%   BMI 33.27 kg/m²     The patient appears well, alert, oriented x 3, in no distress. ENT normal.  Neck supple. No adenopathy or thyromegaly. ROSA. Lungs are clear, good air entry, no wheezes, rhonchi or rales. S1 and S2 normal, no murmurs, regular rate and rhythm. Abdomen soft without tenderness, guarding, mass or organomegaly. Extremities show no edema, normal peripheral pulses. Neurological is normal, no focal findings. Diabetic foot exam: peripheral pulses normal, no pedal edema, no clubbing or cyanosis, feet skin dry. good pulses, normal color, temperature and sensation, monofilament sensory exam is normal in both feet nails are thick and yellowish.          BREAST EXAM: not examined    PELVIC EXAM: examination not indicated    Assessment/Plan:   well woman  mammogram  pap smear  counseled on breast self exam, mammography screening and menopause  return annually or prn    ICD-10-CM ICD-9-CM 1. Well adult on routine health check Z00.00 V70.0 [facility] 3D ALYSIA W MAMMO BI SCREENING INCL CAD       DIABETES FOOT EXAM   2. Screening for breast cancer Z12.31 V76.10 [facility] 3D ALYSIA W MAMMO BI SCREENING INCL CAD   3. Type 2 diabetes mellitus without complication, without long-term current use of insulin (HCC) E11.9 250.00 metFORMIN ER (GLUCOPHAGE XR) 500 mg tablet      REFERRAL TO PODIATRY   4. Essential hypertension I10 401.9    5. Hyperlipidemia LDL goal <70 E78.5 272.4 atorvastatin (LIPITOR) 20 mg tablet   6. Vitamin D deficiency E55.9 268.9 cholecalciferol (VITAMIN D3) 50,000 unit capsule   7. Encounter for immunization Z23 V03.89 PNEUMOCOCCAL POLYSACCHARIDE VACCINE, 23-VALENT, ADULT OR IMMUNOSUPPRESSED PT DOSE,   8. Screening for colon cancer Z12.11 V76.51 REFERRAL FOR COLONOSCOPY     Diagnoses and all orders for this visit:    1. Well adult on routine health check  -     [facility] 3D ALYSIA W MAMMO BI SCREENING INCL CAD; Future  -      DIABETES FOOT EXAM  Lab results discussed in office today. 2. Screening for breast cancer  -     [facility] 3D ALYSIA W MAMMO BI SCREENING INCL CAD; Future    3. Type 2 diabetes mellitus without complication, without long-term current use of insulin (HCC)  -    BS is uncontrolled. Increase  metFORMIN ER (GLUCOPHAGE XR) 500 mg tablet; Take 2 Tabs by mouth two (2) times daily (with meals). -    Continue Januvia. REFERRAL TO PODIATRY    4. Essential hypertension      - BP well controlled with current med. 5. Hyperlipidemia LDL goal <70  -       Lab Results   Component Value Date/Time    LDL, calculated 187 (H) 03/11/2019 08:12 AM     Start atorvastatin (LIPITOR) 20 mg tablet; Take 1 Tab by mouth daily. 6. Vitamin D deficiency  -   Vit D level is 11.7. Start  cholecalciferol (VITAMIN D3) 50,000 unit capsule; Take 1 Cap by mouth every seven (7) days. 7. Encounter for immunization  -     PNEUMOCOCCAL POLYSACCHARIDE VACCINE, 23-VALENT, ADULT OR IMMUNOSUPPRESSED PT DOSE,    8. Screening for colon cancer  -     REFERRAL FOR COLONOSCOPY    9. Thickened nail  -     REFERRAL TO PODIATRY      Follow-up Disposition:  Return in about 2 months (around 5/14/2019) for Bring diabetic log book., Bring BP log book. .  reviewed diet, exercise and weight control  cardiovascular risk and specific lipid/LDL goals reviewed  reviewed medications and side effects in detail  specific diabetic recommendations: low cholesterol diet, weight control and daily exercise discussed, all medications, side effects and compliance discussed carefully, foot care discussed and Podiatry visits discussed, annual eye examinations at Ophthalmology discussed, glycohemoglobin and other lab monitoring discussed and long term diabetic complications discussed.

## 2019-03-21 ENCOUNTER — HOSPITAL ENCOUNTER (OUTPATIENT)
Dept: MAMMOGRAPHY | Age: 59
Discharge: HOME | End: 2019-03-21
Attending: FAMILY MEDICINE
Payer: COMMERCIAL

## 2019-03-21 DIAGNOSIS — Z12.39 SCREENING FOR BREAST CANCER: ICD-10-CM

## 2019-03-21 DIAGNOSIS — Z00.00 WELL ADULT ON ROUTINE HEALTH CHECK: ICD-10-CM

## 2019-03-21 PROCEDURE — 77063 BREAST TOMOSYNTHESIS BI: CPT

## 2019-06-10 DIAGNOSIS — I10 ESSENTIAL HYPERTENSION: Primary | ICD-10-CM

## 2019-06-10 DIAGNOSIS — E78.5 HYPERLIPIDEMIA LDL GOAL <70: ICD-10-CM

## 2019-06-10 DIAGNOSIS — E55.9 VITAMIN D DEFICIENCY: ICD-10-CM

## 2019-06-11 LAB
25(OH)D3+25(OH)D2 SERPL-MCNC: 71.9 NG/ML (ref 30–100)
ALBUMIN SERPL-MCNC: 4.1 G/DL (ref 3.5–5.5)
ALBUMIN/GLOB SERPL: 1.6 {RATIO} (ref 1.2–2.2)
ALP SERPL-CCNC: 93 IU/L (ref 39–117)
ALT SERPL-CCNC: 16 IU/L (ref 0–32)
AST SERPL-CCNC: 16 IU/L (ref 0–40)
BILIRUB SERPL-MCNC: 0.2 MG/DL (ref 0–1.2)
BUN SERPL-MCNC: 23 MG/DL (ref 6–24)
BUN/CREAT SERPL: 20 (ref 9–23)
CALCIUM SERPL-MCNC: 9 MG/DL (ref 8.7–10.2)
CHLORIDE SERPL-SCNC: 97 MMOL/L (ref 96–106)
CHOLEST SERPL-MCNC: 185 MG/DL (ref 100–199)
CO2 SERPL-SCNC: 24 MMOL/L (ref 20–29)
CREAT SERPL-MCNC: 1.15 MG/DL (ref 0.57–1)
GLOBULIN SER CALC-MCNC: 2.5 G/DL (ref 1.5–4.5)
GLUCOSE SERPL-MCNC: 191 MG/DL (ref 65–99)
HDLC SERPL-MCNC: 43 MG/DL
LDLC SERPL CALC-MCNC: 95 MG/DL (ref 0–99)
POTASSIUM SERPL-SCNC: 4.1 MMOL/L (ref 3.5–5.2)
PROT SERPL-MCNC: 6.6 G/DL (ref 6–8.5)
SODIUM SERPL-SCNC: 138 MMOL/L (ref 134–144)
TRIGL SERPL-MCNC: 237 MG/DL (ref 0–149)
VLDLC SERPL CALC-MCNC: 47 MG/DL (ref 5–40)

## 2019-06-14 ENCOUNTER — OFFICE VISIT (OUTPATIENT)
Dept: PRIMARY CARE CLINIC | Age: 59
End: 2019-06-14

## 2019-06-14 VITALS
SYSTOLIC BLOOD PRESSURE: 127 MMHG | RESPIRATION RATE: 18 BRPM | HEIGHT: 67 IN | OXYGEN SATURATION: 99 % | DIASTOLIC BLOOD PRESSURE: 81 MMHG | BODY MASS INDEX: 32.62 KG/M2 | HEART RATE: 99 BPM | TEMPERATURE: 98.2 F | WEIGHT: 207.8 LBS

## 2019-06-14 DIAGNOSIS — Z79.4 TYPE 2 DIABETES MELLITUS WITHOUT COMPLICATION, WITH LONG-TERM CURRENT USE OF INSULIN (HCC): ICD-10-CM

## 2019-06-14 DIAGNOSIS — I10 ESSENTIAL HYPERTENSION: ICD-10-CM

## 2019-06-14 DIAGNOSIS — E11.9 TYPE 2 DIABETES MELLITUS WITHOUT COMPLICATION, WITH LONG-TERM CURRENT USE OF INSULIN (HCC): ICD-10-CM

## 2019-06-14 DIAGNOSIS — E78.5 HYPERLIPIDEMIA LDL GOAL <70: ICD-10-CM

## 2019-06-14 DIAGNOSIS — E55.9 VITAMIN D DEFICIENCY: ICD-10-CM

## 2019-06-14 DIAGNOSIS — E11.9 TYPE 2 DIABETES MELLITUS WITHOUT COMPLICATION, WITHOUT LONG-TERM CURRENT USE OF INSULIN (HCC): Primary | ICD-10-CM

## 2019-06-14 LAB — HBA1C MFR BLD HPLC: 7.7 %

## 2019-06-14 RX ORDER — ATORVASTATIN CALCIUM 40 MG/1
40 TABLET, FILM COATED ORAL DAILY
Qty: 90 TAB | Refills: 0 | Status: SHIPPED | OUTPATIENT
Start: 2019-06-14

## 2019-06-14 RX ORDER — SITAGLIPTIN 50 MG/1
TABLET, FILM COATED ORAL
Qty: 30 TAB | Refills: 3 | Status: SHIPPED | OUTPATIENT
Start: 2019-06-14 | End: 2019-07-17 | Stop reason: SDUPTHER

## 2019-06-14 NOTE — PROGRESS NOTES
Subjective:     Chief Complaint   Patient presents with    Blood Pressure Check    Diabetes        She  is a 66y.o. year old pleasent female with DM-2, HTN is here for follow up. DM-2[de-identified] She is currently on Januvia and Metformin 1000 mg BID. Reports that due to her stress at work she has been keep forgetting to take metformin BID for the past 1 month. Lab Results   Component Value Date/Time    Hemoglobin A1c (POC) 11.7 02/07/2019 03:04 PM       HTN: BP has been well controlled with Lisinopril  10 mg and HCTZ 25 mg.      HLD: she is currently on Lipitor 20 mg. Compliant. She was started on Lipitor three months ago. Last LDL was 187 which dropped to 95 this time. Lab Results   Component Value Date/Time    Cholesterol, total 185 06/10/2019 08:11 AM    HDL Cholesterol 43 06/10/2019 08:11 AM    LDL, calculated 95 06/10/2019 08:11 AM    VLDL, calculated 47 (H) 06/10/2019 08:11 AM    Triglyceride 237 (H) 06/10/2019 08:11 AM       Low vit D:  She was started on weekly vit d three months ago. She is not taking any vit D for the past two weeks. Last Vit d level was 11.7 , now is 71.9. Lab Results   Component Value Date/Time    VITAMIN D, 25-HYDROXY 71.9 06/10/2019 08:11 AM       Patient reports that she continue to work on healthy diet and continue to loose weight. She feels lot better than how she was feeling few months ago. Denies any chest pain, soa, cough, abdominal pain. Pertinent items are noted in HPI. Objective: There were no vitals filed for this visit.     Physical Examination: General appearance - alert, well appearing, and in no distress, oriented to person, place, and time and overweight  Mental status - alert, oriented to person, place, and time, normal mood, behavior, speech, dress, motor activity, and thought processes  Chest - clear to auscultation, no wheezes, rales or rhonchi, symmetric air entry  Heart - normal rate, regular rhythm, normal S1, S2, no murmurs, rubs, clicks or gallops  Extremities - no pedal edema noted    Allergies   Allergen Reactions    Pcn [Penicillins] Unknown (comments)     Had reaction as child but does not remember what kind of reaction      Social History     Socioeconomic History    Marital status:      Spouse name: Not on file    Number of children: Not on file    Years of education: Not on file    Highest education level: Not on file   Tobacco Use    Smoking status: Former Smoker    Smokeless tobacco: Never Used   Substance and Sexual Activity    Alcohol use: No     Frequency: Never    Drug use: No    Sexual activity: Yes     Partners: Male      Family History   Problem Relation Age of Onset    Hypertension Mother     Diabetes Father     Hypertension Sister       Past Surgical History:   Procedure Laterality Date    HX BUNIONECTOMY  1998    HX TONSILLECTOMY      HX WISDOM TEETH EXTRACTION        Past Medical History:   Diagnosis Date    Depression     HPV in female     Seizure [facility])     as a child      Current Outpatient Medications   Medication Sig Dispense Refill    hydroCHLOROthiazide (HYDRODIURIL) 25 mg tablet TAKE 1 TABLET BY MOUTH ONCE DAILY 90 Tab 0    lisinopril (PRINIVIL, ZESTRIL) 10 mg tablet TAKE 1 TABLET BY MOUTH ONCE DAILY 90 Tab 0    metFORMIN ER (GLUCOPHAGE XR) 500 mg tablet Take 2 Tabs by mouth two (2) times daily (with meals). 120 Tab 2    atorvastatin (LIPITOR) 20 mg tablet Take 1 Tab by mouth daily. 90 Tab 1    cholecalciferol (VITAMIN D3) 50,000 unit capsule Take 1 Cap by mouth every seven (7) days.  12 Cap 0    Blood-Glucose Meter (ONETOUCH ULTRAMINI) monitoring kit Checking fasting blood sugar Dx: Type 2 diabetes mellitus without complication, with long-term current use of insulin E11.9 1 Kit 0    lancets (ONETOUCH ULTRASOFT LANCETS) misc Checking fasting blood sugar Dx: Type 2 diabetes mellitus without complication, with long-term current use of insulin E11.9 1 Each 11    glucose blood VI test strips (ONETOUCH ULTRA BLUE TEST STRIP) strip Checking fasting blood sugar Dx: Type 2 diabetes mellitus without complication, with long-term current use of insulin E11.9 100 Strip 11    phenylephrine HCl (SUDAFED PE PO) Take 1 Tab by mouth every morning.  SITagliptin (JANUVIA) 50 mg tablet Take 1 Tab by mouth daily. 30 Tab 3    biotin 10,000 mcg TbDi Take 10,000 mcg by mouth daily.  aspirin delayed-release 81 mg tablet Take  by mouth daily. Assessment/ Plan:   Diagnoses and all orders for this visit:    1. Type 2 diabetes mellitus without complication, without long-term current use of insulin (HCC)  -     AMB POC HEMOGLOBIN A1C is 7.7 dropped from 11.7. Greatly improved. -    Advised to continue current med advised. - continue work on diet and exercise. 2. Essential hypertension      - BP well controlled with current med. 3. Hyperlipidemia LDL goal <70  -   LDL is not at goal. Increase   atorvastatin (LIPITOR) 40 mg tablet; Take 1 Tab by mouth daily. 4. Vitamin D deficiency      - She finished the weekly vit d. Vit d level is very good. Advised to take maintenance Vit D 2,000 iu daily. Medication risks/benefits/costs/interactions/alternatives discussed with patient. Advised patient to call back or return to office if symptoms worsen/change/persist. If patient cannot reach us or should anything more severe/urgent arise he/she should proceed directly to the nearest emergency department. Discussed expected course/resolution/complications of diagnosis in detail with patient. Patient given a written after visit summary which includes her diagnoses, current medications and vitals. Patient expressed understanding with the diagnosis and plan. Follow-up and Dispositions    · Return in about 3 months (around 9/14/2019) for Cholesterol, blood pressure. Eloina Eckert

## 2019-07-12 DIAGNOSIS — I10 ESSENTIAL HYPERTENSION: ICD-10-CM

## 2019-07-12 DIAGNOSIS — Z76.89 ENCOUNTER TO ESTABLISH CARE: ICD-10-CM

## 2019-07-12 RX ORDER — LISINOPRIL 10 MG/1
TABLET ORAL
Qty: 90 TAB | Refills: 0 | Status: SHIPPED | OUTPATIENT
Start: 2019-07-12

## 2019-07-17 DIAGNOSIS — E11.9 TYPE 2 DIABETES MELLITUS WITHOUT COMPLICATION, WITH LONG-TERM CURRENT USE OF INSULIN (HCC): ICD-10-CM

## 2019-07-17 DIAGNOSIS — Z79.4 TYPE 2 DIABETES MELLITUS WITHOUT COMPLICATION, WITH LONG-TERM CURRENT USE OF INSULIN (HCC): ICD-10-CM

## 2019-07-18 ENCOUNTER — TELEPHONE (OUTPATIENT)
Dept: PRIMARY CARE CLINIC | Age: 59
End: 2019-07-18

## 2019-07-18 DIAGNOSIS — E11.9 TYPE 2 DIABETES MELLITUS WITHOUT COMPLICATION, WITH LONG-TERM CURRENT USE OF INSULIN (HCC): Primary | ICD-10-CM

## 2019-07-18 DIAGNOSIS — Z79.4 TYPE 2 DIABETES MELLITUS WITHOUT COMPLICATION, WITH LONG-TERM CURRENT USE OF INSULIN (HCC): Primary | ICD-10-CM

## 2019-07-18 NOTE — TELEPHONE ENCOUNTER
Has been taking Januvia and has now changed insurances and will not cover it so would like something else prescribed

## 2019-07-19 NOTE — TELEPHONE ENCOUNTER
Prescription covered and savings card applied, $10 for patient. Pt notified and verbalized her understanding.

## 2019-08-03 DIAGNOSIS — E11.9 TYPE 2 DIABETES MELLITUS WITHOUT COMPLICATION, WITHOUT LONG-TERM CURRENT USE OF INSULIN (HCC): ICD-10-CM

## 2019-08-05 RX ORDER — METFORMIN HYDROCHLORIDE 500 MG/1
TABLET, EXTENDED RELEASE ORAL
Qty: 120 TAB | Refills: 2 | Status: SHIPPED | OUTPATIENT
Start: 2019-08-05

## 2020-03-23 ENCOUNTER — HOSPITAL ENCOUNTER (OUTPATIENT)
Dept: ULTRASOUND IMAGING | Age: 60
Discharge: HOME | End: 2020-03-23
Payer: COMMERCIAL

## 2020-03-23 DIAGNOSIS — N28.9 FUNCTION KIDNEY DECREASED: ICD-10-CM

## 2020-03-23 DIAGNOSIS — I10 HYPERTENSION, ESSENTIAL: ICD-10-CM

## 2020-03-23 DIAGNOSIS — E11.9 DIABETES MELLITUS, TYPE 2 (HCC): ICD-10-CM

## 2020-03-23 PROCEDURE — 76770 US EXAM ABDO BACK WALL COMP: CPT

## 2020-07-28 ENCOUNTER — TELEPHONE (OUTPATIENT)
Dept: PRIMARY CARE CLINIC | Age: 60
End: 2020-07-28

## 2020-07-28 NOTE — TELEPHONE ENCOUNTER
Patient has been advised to either pay out of pocket for typing or she can go donate blood for this information.

## 2020-07-28 NOTE — TELEPHONE ENCOUNTER
Pt is calling to speak with a nurse. She is inquiring about her blood type. She would like a call back asap.

## 2021-03-31 ENCOUNTER — TRANSCRIBE ORDER (OUTPATIENT)
Dept: SCHEDULING | Age: 61
End: 2021-03-31

## 2021-03-31 DIAGNOSIS — S46.812A STRAIN OF OTHER MUSCLES, FASCIA AND TENDONS AT SHOULDER AND UPPER ARM LEVEL, LEFT ARM, INITIAL ENCOUNTER: Primary | ICD-10-CM

## 2021-04-06 ENCOUNTER — TRANSCRIBE ORDER (OUTPATIENT)
Dept: SCHEDULING | Age: 61
End: 2021-04-06

## 2021-04-06 DIAGNOSIS — S46.812A STRAIN OF OTHER MUSCLES, FASCIA AND TENDONS AT SHOULDER AND UPPER ARM LEVEL, LEFT ARM, INITIAL ENCOUNTER: Primary | ICD-10-CM

## 2021-04-18 ENCOUNTER — HOSPITAL ENCOUNTER (OUTPATIENT)
Dept: MRI IMAGING | Age: 61
Discharge: HOME | End: 2021-04-18
Attending: NURSE PRACTITIONER
Payer: COMMERCIAL

## 2021-04-18 DIAGNOSIS — S46.812A STRAIN OF OTHER MUSCLES, FASCIA AND TENDONS AT SHOULDER AND UPPER ARM LEVEL, LEFT ARM, INITIAL ENCOUNTER: ICD-10-CM

## 2021-04-18 PROCEDURE — 73221 MRI JOINT UPR EXTREM W/O DYE: CPT

## 2021-12-30 ENCOUNTER — TRANSCRIBE ORDER (OUTPATIENT)
Dept: SCHEDULING | Age: 61
End: 2021-12-30

## 2022-01-26 ENCOUNTER — TRANSCRIBE ORDER (OUTPATIENT)
Dept: SCHEDULING | Age: 62
End: 2022-01-26

## 2022-01-26 DIAGNOSIS — K21.9 ESOPHAGEAL REFLUX: ICD-10-CM

## 2022-01-26 DIAGNOSIS — J30.9 RHINITIS, ALLERGIC: ICD-10-CM

## 2022-01-26 DIAGNOSIS — Z00.8 OTHER SPECIFIED GENERAL MEDICAL EXAMINATION: ICD-10-CM

## 2022-01-26 DIAGNOSIS — R05.9 COUGH: ICD-10-CM

## 2022-01-26 DIAGNOSIS — Z78.9 NON-SMOKER: Primary | ICD-10-CM

## 2022-03-18 PROBLEM — E11.9 TYPE 2 DIABETES MELLITUS WITHOUT COMPLICATION, WITHOUT LONG-TERM CURRENT USE OF INSULIN (HCC): Status: ACTIVE | Noted: 2019-02-07

## 2022-03-18 PROBLEM — E78.5 HYPERLIPIDEMIA LDL GOAL <70: Status: ACTIVE | Noted: 2019-03-14

## 2022-03-19 PROBLEM — I10 ESSENTIAL HYPERTENSION: Status: ACTIVE | Noted: 2019-02-04

## 2022-03-19 PROBLEM — E55.9 VITAMIN D DEFICIENCY: Status: ACTIVE | Noted: 2019-03-14

## 2022-05-27 ENCOUNTER — TRANSCRIBE ORDER (OUTPATIENT)
Dept: SCHEDULING | Age: 62
End: 2022-05-27

## 2022-05-27 DIAGNOSIS — Z12.31 SCREENING MAMMOGRAM FOR HIGH-RISK PATIENT: Primary | ICD-10-CM

## 2022-06-16 ENCOUNTER — HOSPITAL ENCOUNTER (OUTPATIENT)
Dept: MAMMOGRAPHY | Age: 62
Discharge: HOME | End: 2022-06-16
Attending: NURSE PRACTITIONER
Payer: COMMERCIAL

## 2022-06-16 DIAGNOSIS — Z12.31 SCREENING MAMMOGRAM FOR HIGH-RISK PATIENT: ICD-10-CM

## 2022-06-16 PROCEDURE — 77067 SCR MAMMO BI INCL CAD: CPT
